# Patient Record
Sex: FEMALE | Race: WHITE | NOT HISPANIC OR LATINO | Employment: FULL TIME | URBAN - METROPOLITAN AREA
[De-identification: names, ages, dates, MRNs, and addresses within clinical notes are randomized per-mention and may not be internally consistent; named-entity substitution may affect disease eponyms.]

---

## 2022-01-11 ENCOUNTER — LAB REQUISITION (OUTPATIENT)
Dept: LAB | Facility: CLINIC | Age: 32
End: 2022-01-11

## 2022-01-11 PROCEDURE — 86706 HEP B SURFACE ANTIBODY: CPT | Performed by: PLASTIC SURGERY

## 2022-01-11 PROCEDURE — 87389 HIV-1 AG W/HIV-1&-2 AB AG IA: CPT | Performed by: PLASTIC SURGERY

## 2022-01-11 PROCEDURE — 87522 HEPATITIS C REVRS TRNSCRPJ: CPT | Performed by: PLASTIC SURGERY

## 2022-01-11 PROCEDURE — 86803 HEPATITIS C AB TEST: CPT | Performed by: PLASTIC SURGERY

## 2022-01-12 LAB
HBV SURFACE AB SERPL IA-ACNC: 102.1 M[IU]/ML
HCV AB SERPL QL IA: NONREACTIVE
HIV 1+2 AB+HIV1 P24 AG SERPL QL IA: NONREACTIVE

## 2022-01-13 LAB — HCV RNA SERPL NAA+PROBE-ACNC: NOT DETECTED IU/ML

## 2024-06-05 ENCOUNTER — HOSPITAL ENCOUNTER (EMERGENCY)
Facility: CLINIC | Age: 34
Discharge: HOME | End: 2024-06-05
Attending: EMERGENCY MEDICINE | Admitting: EMERGENCY MEDICINE
Payer: OTHER MISCELLANEOUS

## 2024-06-05 VITALS
OXYGEN SATURATION: 99 % | SYSTOLIC BLOOD PRESSURE: 153 MMHG | RESPIRATION RATE: 20 BRPM | DIASTOLIC BLOOD PRESSURE: 87 MMHG | HEART RATE: 67 BPM | WEIGHT: 183 LBS | HEIGHT: 60 IN | BODY MASS INDEX: 35.93 KG/M2 | TEMPERATURE: 98.2 F

## 2024-06-05 DIAGNOSIS — M62.830 BACK MUSCLE SPASM: ICD-10-CM

## 2024-06-05 DIAGNOSIS — M54.50 ACUTE BILATERAL LOW BACK PAIN WITHOUT SCIATICA: ICD-10-CM

## 2024-06-05 PROCEDURE — 99284 EMERGENCY DEPT VISIT MOD MDM: CPT | Mod: 25

## 2024-06-05 PROCEDURE — 96374 THER/PROPH/DIAG INJ IV PUSH: CPT

## 2024-06-05 PROCEDURE — 250N000013 HC RX MED GY IP 250 OP 250 PS 637: Performed by: EMERGENCY MEDICINE

## 2024-06-05 PROCEDURE — 250N000011 HC RX IP 250 OP 636: Performed by: EMERGENCY MEDICINE

## 2024-06-05 RX ORDER — CYCLOBENZAPRINE HCL 10 MG
10 TABLET ORAL 3 TIMES DAILY PRN
Qty: 20 TABLET | Refills: 0 | Status: SHIPPED | OUTPATIENT
Start: 2024-06-05 | End: 2024-06-11

## 2024-06-05 RX ORDER — KETOROLAC TROMETHAMINE 15 MG/ML
15 INJECTION, SOLUTION INTRAMUSCULAR; INTRAVENOUS ONCE
Status: COMPLETED | OUTPATIENT
Start: 2024-06-05 | End: 2024-06-05

## 2024-06-05 RX ORDER — METHYLPREDNISOLONE 4 MG
TABLET, DOSE PACK ORAL
Qty: 21 TABLET | Refills: 0 | Status: SHIPPED | OUTPATIENT
Start: 2024-06-05

## 2024-06-05 RX ORDER — DIAZEPAM 5 MG
5 TABLET ORAL ONCE
Status: COMPLETED | OUTPATIENT
Start: 2024-06-05 | End: 2024-06-05

## 2024-06-05 RX ORDER — OXYCODONE HYDROCHLORIDE 5 MG/1
5 TABLET ORAL EVERY 6 HOURS PRN
Qty: 10 TABLET | Refills: 0 | Status: SHIPPED | OUTPATIENT
Start: 2024-06-05 | End: 2024-06-08

## 2024-06-05 RX ORDER — OXYCODONE HYDROCHLORIDE 5 MG/1
5 TABLET ORAL ONCE
Status: COMPLETED | OUTPATIENT
Start: 2024-06-05 | End: 2024-06-05

## 2024-06-05 RX ADMIN — KETOROLAC TROMETHAMINE 15 MG: 15 INJECTION, SOLUTION INTRAMUSCULAR; INTRAVENOUS at 13:24

## 2024-06-05 RX ADMIN — DIAZEPAM 5 MG: 5 TABLET ORAL at 13:23

## 2024-06-05 RX ADMIN — OXYCODONE HYDROCHLORIDE 5 MG: 5 TABLET ORAL at 14:50

## 2024-06-05 ASSESSMENT — COLUMBIA-SUICIDE SEVERITY RATING SCALE - C-SSRS
6. HAVE YOU EVER DONE ANYTHING, STARTED TO DO ANYTHING, OR PREPARED TO DO ANYTHING TO END YOUR LIFE?: NO
2. HAVE YOU ACTUALLY HAD ANY THOUGHTS OF KILLING YOURSELF IN THE PAST MONTH?: NO
1. IN THE PAST MONTH, HAVE YOU WISHED YOU WERE DEAD OR WISHED YOU COULD GO TO SLEEP AND NOT WAKE UP?: NO

## 2024-06-05 ASSESSMENT — ACTIVITIES OF DAILY LIVING (ADL)
ADLS_ACUITY_SCORE: 35

## 2024-06-05 NOTE — ED PROVIDER NOTES
Emergency Department Note      History of Present Illness     Chief Complaint  Back Pain and Back Injury    HPI  Isa Day is a 33 year old female who presents due to back pain. The patient reports that she works as a surgical tech at the [employer] when she bent down to  a box of chux. After bending, the patient notes that she heard a popping sound followed by lower back pain. The patient reports difficulty walking, sitting, and laying down due to pain. 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 The denies bowel or bladder incontinence, numbness or weakness in extremities, cough, congestion, or fever. She also denies IV drug use or recent injections in his back.     Independent Historian  None    Review of External Notes  I reviewed the sports medicine note from 1/26/2024.   Past Medical History   Medical History and Problem List  Reactive attachment disorder  GERD   Depression     Medications  Lexapro     Surgical History   [place] teeth extraction   Femur surgery   Physical Exam   Patient Vitals for the past 24 hrs:   BP Temp Pulse Resp SpO2 Height Weight   06/05/24 1605 -- -- 67 20 99 % -- --   06/05/24 1600 (!) 153/87 -- -- -- -- -- --   06/05/24 0937 (!) 185/92 98.2  F (36.8  C) 79 18 98 % 1.524 m (5') 83 kg (183 lb)     Physical Exam  General: Resting on the bed.  Head: No obvious trauma to head.  Ears, Nose, Throat:  External ears normal.  Nose normal.  No pharyngeal erythema, swelling or exudate.  Midline uvula.    Eyes:  Conjunctivae clear.  Pupils are equal, round, and reactive.   Neck: Normal range of motion.  Neck supple.   CV: Regular rate and rhythm.  No murmurs.      Respiratory: Effort normal and breath sounds normal.  No wheezing or crackles.   Gastrointestinal: Soft.  No distension. There is no tenderness.  There is no rigidity, no rebound and no guarding.   Musculoskeletal: Normal range of motion.  Non tender extremities  to palpations.  Nontender thoracic, lumbar spine.  No step-off or deformity.  Tenderness over the bilateral lower back, tenderness over the bilateral paraspinous muscles.  Neuro: Alert. Moving all extremities appropriately.  Normal speech.  5/5 lower extremity strength.  Sensation intact to light touch.  2+ patellar reflexes.  No saddle anesthesia.  Skin: Skin is warm and dry.  No rash noted.     Diagnostics   Lab Results   Labs Ordered and Resulted from Time of ED Arrival to Time of ED Departure - No data to display    Imaging  No orders to display       EKG     Independent Interpretation  None  ED Course    Medications Administered  Medications   diazepam (VALIUM) tablet 5 mg (5 mg Oral $Given 6/5/24 1323)   ketorolac (TORADOL) injection 15 mg (15 mg Intravenous $Given 6/5/24 1324)   oxyCODONE (ROXICODONE) tablet 5 mg (5 mg Oral $Given 6/5/24 1450)       Procedures  Procedures     Discussion of Management  None    Social Determinants of Health adding to complexity of care  None    ED Course  ED Course as of 06/05/24 1657   Wed Jun 05, 2024   1301 I obtained history and examined the patient as noted above.    1557 I reassessed the patient.  Discussed results.  Patient is agreeable and discharged home.     Medical Decision Making / Diagnosis   CMS Diagnoses: None    MIPS     None    MDM  Isa Day is a 33 year old female who presents to the ER with back pain.  Vital signs are reassuring.  She is hypertensive but this is likely related to pain.  This did improve on recheck.  She was advised to continue to follow this.  Broad differentials considered include not limited to muscle spasm, fracture, dislocation, sprain strain, herniated disc, epidural abscess, epidural hematoma, discitis, referred pain, etc.  Patient denies any falls or trauma.  No tenderness to midline.  Does not appear consistent with fracture or dislocation.  We discussed possible x-rays and she declines at this time.  This seems reasonable  without any traumatic injuries.  Patient has no saddle anesthesia, no fevers or chills, no blood thinner use, no IV street drug use, I have very low suspicion for epidural abscess, epidural hematoma, discitis, etc.  She has no focal weakness numbness or tingling.  There is no indication for emergent MRI at this time.  Rather supportive care.  She felt improved after above interventions.  Did not feel the Valium was helpful so therefore opted to switch her to Flexeril as an outpatient.  Oxycodone to use sparingly.  Tylenol ibuprofen as well as a Medrol Dosepak.  We discussed return precautions including weakness numbness or tingling in the groin.  Discussed following up with orthospine.  Patient is agreeable and is discharged home.    Disposition  The patient was discharged.     ICD-10 Codes:    ICD-10-CM    1. Acute bilateral low back pain without sciatica  M54.50       2. Back muscle spasm  M62.830            Discharge Medications  Discharge Medication List as of 6/5/2024  4:03 PM        START taking these medications    Details   cyclobenzaprine (FLEXERIL) 10 MG tablet Take 1 tablet (10 mg) by mouth 3 times daily as needed for muscle spasms, Disp-20 tablet, R-0, E-Prescribe      methylPREDNISolone (MEDROL DOSEPAK) 4 MG tablet therapy pack Follow Package Directions, Disp-21 tablet, R-0, E-Prescribe      oxyCODONE (ROXICODONE) 5 MG tablet Take 1 tablet (5 mg) by mouth every 6 hours as needed for pain, Disp-10 tablet, R-0, E-Prescribe             Scribe Disclosure:  I, Shalini Pyle, am serving as a scribe at 12:38 PM on 6/5/2024 to document services personally performed by Marisol Montgomery MD based on my observations and the provider's statements to me.        Marisol Montgomery MD  06/05/24 2440

## 2024-06-05 NOTE — DISCHARGE INSTRUCTIONS
Please see your PCP in 2-3 days for a recheck.  If you have increasing pain, loss of bowel or bladder function, numbness in your groin, unable to walk, fevers >101 or other acute changes, return to the ED.      May alternate flexeril and oxycodone.  Be cautious as these both may make you sleepy.  Do not drink drive or operate Heavy machinery.    Discharge Instructions  Back Pain  You were seen today for back pain. Back pain can have many causes, but most will get better without surgery or other specific treatment. Sometimes there is a herniated ( slipped ) disc. We do not usually do MRI scans to look for these right away, since most herniated discs will get better on their own with time.  Today, we did not find any evidence that your back pain was caused by a serious condition. However, sometimes symptoms develop over time and cannot be found during an emergency visit, so it is very important that you follow up with your primary provider.  Generally, every Emergency Department visit should have a follow-up clinic visit with either a primary or a specialty clinic/provider. Please follow-up as instructed by your emergency provider today.    Return to the Emergency Department if:  You develop a fever with your back pain.   You have weakness or change in sensation in one or both legs.  You lose control of your bowels or bladder, or cannot empty your bladder (cannot pee).  Your pain gets much worse.     Follow-up with your provider:  Unless your pain has completely gone away, please make an appointment with your provider within one week. Most of the routine care for back pain is available in a clinic and not the Emergency Department. You may need further management of your back pain, such as more pain medication, imaging such as an X-ray or MRI, or physical therapy.    What can I do to help myself?  Remain Active -- People are often afraid that they will hurt their back further or delay recovery by remaining active, but  this is one of the best things you can do for your back. In fact, staying in bed for a long time to rest is not recommended. Studies have shown that people with low back pain recover faster when they remain active. Movement helps to bring blood flow to the muscles and relieve muscle spasms as well as preventing loss of muscle strength.  Heat -- Using a heating pad can help with low back pain during the first few weeks. Do not sleep with a heating pad, as you can be burned.   Pain medications - You may take a pain medication such as Tylenol  (acetaminophen), Advil , Motrin  (ibuprofen) or Aleve  (naproxen).  If you were given a prescription for medicine here today, be sure to read all of the information (including the package insert) that comes with your prescription.  This will include important information about the medicine, its side effects, and any warnings that you need to know about.  The pharmacist who fills the prescription can provide more information and answer questions you may have about the medicine.  If you have questions or concerns that the pharmacist cannot address, please call or return to the Emergency Department.   Remember that you can always come back to the Emergency Department if you are not able to see your regular provider in the amount of time listed above, if you get any new symptoms, or if there is anything that worries you.

## 2024-06-05 NOTE — ED TRIAGE NOTES
[facility]  ED Arrival Note    Arrives through triage. ABC's intact. A &O X4. . Pt arrives with c/o lower back injury while working. Rpeorts she was putting supplies away and heard a pop in her back. Now, 10/10 pain, unable to sit. Dneies urinary or bowel incontinence. Negative for extremity numbness or tingling.       Visitors during triage: None      Triage Interventions: Direct rooming     Ambulatory: Yes    Meets Stroke Criteria?: No    Meets Trauma Criteria?: No    Shock Index (HR/SBP): <0.8, for provider reference    Directed to: Triage Lobby    Pronouns: she/her       Triage Assessment (Adult)       Row Name 06/05/24 0942          Triage Assessment    Airway WDL WDL        Respiratory WDL    Respiratory WDL WDL        Skin Circulation/Temperature WDL    Skin Circulation/Temperature WDL WDL        Cardiac WDL    Cardiac WDL WDL        Peripheral/Neurovascular WDL    Peripheral Neurovascular WDL WDL        Cognitive/Neuro/Behavioral WDL    Cognitive/Neuro/Behavioral WDL WDL

## 2024-07-10 ENCOUNTER — HOSPITAL ENCOUNTER (OUTPATIENT)
Facility: CLINIC | Age: 34
Setting detail: OBSERVATION
Discharge: HOME | End: 2024-07-12
Attending: EMERGENCY MEDICINE | Admitting: NURSE PRACTITIONER
Payer: COMMERCIAL

## 2024-07-10 DIAGNOSIS — F33.1 MAJOR DEPRESSIVE DISORDER, RECURRENT EPISODE, MODERATE (H): ICD-10-CM

## 2024-07-10 DIAGNOSIS — F99 INSOMNIA DUE TO OTHER MENTAL DISORDER: ICD-10-CM

## 2024-07-10 DIAGNOSIS — Z72.89 DELIBERATE SELF-CUTTING: ICD-10-CM

## 2024-07-10 DIAGNOSIS — F51.05 INSOMNIA DUE TO OTHER MENTAL DISORDER: ICD-10-CM

## 2024-07-10 DIAGNOSIS — R45.851 SUICIDAL IDEATION: ICD-10-CM

## 2024-07-10 PROBLEM — F32.A DEPRESSION, UNSPECIFIED DEPRESSION TYPE: Status: ACTIVE | Noted: 2024-07-10

## 2024-07-10 PROBLEM — F32.9 MAJOR DEPRESSIVE DISORDER WITH CURRENT ACTIVE EPISODE: Status: ACTIVE | Noted: 2024-07-10

## 2024-07-10 PROBLEM — F32.9 MAJOR DEPRESSIVE DISORDER WITH CURRENT ACTIVE EPISODE: Status: RESOLVED | Noted: 2024-07-10 | Resolved: 2024-07-10

## 2024-07-10 PROCEDURE — G0378 HOSPITAL OBSERVATION PER HR: HCPCS

## 2024-07-10 PROCEDURE — 99285 EMERGENCY DEPT VISIT HI MDM: CPT

## 2024-07-10 PROCEDURE — 250N000013 HC RX MED GY IP 250 OP 250 PS 637: Performed by: NURSE PRACTITIONER

## 2024-07-10 PROCEDURE — 99223 1ST HOSP IP/OBS HIGH 75: CPT | Performed by: NURSE PRACTITIONER

## 2024-07-10 RX ORDER — ESCITALOPRAM OXALATE 10 MG/1
10 TABLET ORAL DAILY
COMMUNITY
End: 2024-07-12

## 2024-07-10 RX ORDER — TRAZODONE HYDROCHLORIDE 50 MG/1
50 TABLET, FILM COATED ORAL AT BEDTIME
Status: DISCONTINUED | OUTPATIENT
Start: 2024-07-10 | End: 2024-07-12 | Stop reason: HOSPADM

## 2024-07-10 RX ORDER — ACETAMINOPHEN 325 MG/1
650 TABLET ORAL EVERY 6 HOURS PRN
Status: DISCONTINUED | OUTPATIENT
Start: 2024-07-10 | End: 2024-07-12 | Stop reason: HOSPADM

## 2024-07-10 RX ORDER — ESCITALOPRAM OXALATE 20 MG/1
20 TABLET ORAL DAILY
Status: DISCONTINUED | OUTPATIENT
Start: 2024-07-11 | End: 2024-07-12 | Stop reason: HOSPADM

## 2024-07-10 RX ORDER — HYDROXYZINE HYDROCHLORIDE 50 MG/1
50 TABLET, FILM COATED ORAL EVERY 6 HOURS PRN
Status: DISCONTINUED | OUTPATIENT
Start: 2024-07-10 | End: 2024-07-12 | Stop reason: HOSPADM

## 2024-07-10 RX ORDER — ACETAMINOPHEN AND CODEINE PHOSPHATE 120; 12 MG/5ML; MG/5ML
1 SOLUTION ORAL DAILY
COMMUNITY
Start: 2023-10-26

## 2024-07-10 RX ADMIN — HYDROXYZINE HYDROCHLORIDE 50 MG: 50 TABLET, FILM COATED ORAL at 17:09

## 2024-07-10 RX ADMIN — TRAZODONE HYDROCHLORIDE 50 MG: 50 TABLET ORAL at 22:02

## 2024-07-10 ASSESSMENT — ACTIVITIES OF DAILY LIVING (ADL)
ADLS_ACUITY_SCORE: 35

## 2024-07-10 NOTE — ED PROVIDER NOTES
[facility] Unit - Initial Psychiatric Observation Note  [facility] Emergency Department  Observation Initiation Date: Jul 10, 2024    Isa Dya MRN: [id number]   Age: 34 year old YOB: 1990     History     Chief Complaint   Patient presents with    Suicidal    Depression     HPI  Isa Day is a 34 year old female with a past history notable for major depressive disorder who presented to the emergency department for worsening depression accompanied by suicidal ideation and self-harm behavior.  Patient was medically evaluated in the emergency department and determined to be medically stable for transfer to [facility] for further psychiatric assessment.  Patient is nearing 3.5 hours in emergency care at this time.    Here at [facility], patient describes recent worsening in depressive symptoms over the past 2 weeks without an identifiable trigger.  She reports she started taking escitalopram 10 mg in January of this year, and experienced significant improvement in mood symptoms.  She reports sustaining these improvements until about 2 weeks ago.  Since then, she is noting increased hopelessness, accompanied by increased intensity of suicidal thinking.  She has engaged in self injury by cutting recently.  She reports thoughts of cutting deeply enough to bleed out and die, but has not yet done this.  She describes decreased motivation, lack of interest, low energy, decreased concentration.  She endorses lack of appetite, needing to force herself to eat at times.  She reports very poor sleep and has only slept 2 to 3 hours per night the last 3-4 nights.  She finds her mind wandering at night, unable to quiet her thoughts.  She does not feel as though anxiety is contributing to her current level of distress.  She notes stressors which include her relationship as well as her job.  She denies any changes with her job recently.  In regard to her relationship, she reports that this is her first serious relationship and  "she struggles going \"how to be in the relationship.\"  She is not able to expand upon this further.  She denies any communication issues or specific problems within her relationship.  She is seeking medication changes aimed at improving mood and sleep quality.  She is open to therapeutic resources following discharge.  She anticipates remaining on observation overnight.      Past Medical History  No past medical history on file.  No past surgical history on file.  norethindrone (MICRONOR) 0.35 MG tablet  escitalopram (LEXAPRO) 10 MG tablet  methylPREDNISolone (MEDROL DOSEPAK) 4 MG tablet therapy pack      Allergies   Allergen Reactions    Latex Hives     Family History  No family history on file.  Social History           Review of Systems  A medically appropriate review of systems was performed with pertinent positives and negatives noted in the HPI, and all other systems negative.    Physical Examination   BP: (!) 175/98  Pulse: 72  Temp: 98.9  F (37.2  C)  Resp: 16  Weight: 83 kg (183 lb)  SpO2: 100 %    Physical Exam  General: Appears stated age.   Neuro: Alert and fully oriented. Extremities appear to demonstrate normal strength on visual inspection.   Integumentary/Skin: no rash visualized, normal color    Psychiatric Examination   Appearance: awake, alert, adequately groomed, appeared as age stated, and casually dressed  Attitude:  cooperative  Eye Contact:  fair  Mood:  depressed  Affect:  mood congruent and intensity is blunted  Speech:  clear, coherent and normal prosody  Psychomotor Behavior:  no evidence of tardive dyskinesia, dystonia, or tics  Thought Process:  linear and goal oriented  Associations:  no loose associations  Thought Content:  no evidence of psychotic thought, passive suicidal ideation present, no auditory hallucinations present, and no visual hallucinations present  Insight:  fair  Judgement:  fair  Oriented to:  time, person, and place  Attention Span and Concentration:  fair  Recent " and Remote Memory:  intact  Language: able to name/identify objects without impairment  Fund of Knowledge: intact with awareness of current and past events    ED Course     ED Course as of 07/10/24 1726   Wed Jul 10, 2024   1417 I obtained history and examined the patient as noted above         Labs Ordered and Resulted from Time of ED Arrival to Time of ED Departure - No data to display    Assessments & Plan (with Medical Decision Making)   Patient presenting with worsening depressive symptoms over the last 2 weeks with no identifiable trigger.  Patient began taking Lexapro 10 mg in January of this year, and had good results for the past 6 months but no longer feels it is effective.  Given her initial benefit, we will try a higher dose prior to switching medications.  She would benefit from DBT or IOP upon discharge.. Nursing notes reviewed noting no acute issues.     I have reviewed the assessment completed by the [facility].     During the observation period, the patient did not require medications for agitation, and did not require restraints/seclusion for patient and/or provider safety.     The patient was found to have a psychiatric condition that would benefit from an observation stay in the emergency department for further psychiatric stabilization and/or coordination of a safe disposition. The observation plan includes serial assessments of psychiatric condition, potential administration of medications if indicated, further disposition pending the patient's psychiatric course during the monitoring period.     Preliminary diagnosis:    ICD-10-CM    1. Deliberate self-cutting  Z72.89       2. Major depressive disorder, recurrent episode, moderate (H)  F33.1       3. Insomnia due to other mental disorder  F51.05     F99       4. Suicidal ideation  R45.851            Treatment Plan:  - Increase Lexapro from 10 mg daily to 20 mg daily to further target symptoms of depression  - Trial trazodone 50 mg nightly for  treatment of insomnia  - Hydroxyzine 50 mg every 6 hours as needed for symptoms of anxiety  - Patient may benefit from IOP or a DBT program following discharge  - [place] to observation status with plan for reassessment tomorrow    ---      Haryr Pittman CNP   [facility] EMERGENCY DEPT  [facility] Unit      Harry Pittman CNP  07/10/24 [id number]

## 2024-07-10 NOTE — PROGRESS NOTES
"Patient is 34 year old female with history of depression, anxiety received from ED due to having increasing anxiety, and depressed mood. Reports feeling hopeless, having suicidal thought for two weeks. and Pt engage in self injury, and cut her wrist last night. Pt endorses  SI with plan to cut her wrist and bleed out. She reports she is been having difficult sleeping for sometime now, sleeping about 2 to 3 hours at night for the two week. Pt crying during intake stating\" I know I am suppose to be here, I need all the help\" Pt reports this is not the first episodes of self injury. This has happens before. She wants medication management, and and therapeutic resources that can help her anxiety. Pt denies auditory and visual hallucination.     Nursing and risk assessments completed. Assessments reviewed with [facility] and physician. Admission information reviewed with patient. Patient given a tour of [facility] and instructions on using the facility. Questions regarding [facility] addressed. Pt safety search completed.    "
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
## 2024-07-10 NOTE — ED NOTES
[facility]  ED to EMPATH Checklist:      Goal for EMPATH: Depression management    Current Behavior: Sad    Safety Concerns: Suicidal, no plan    Legal Hold Status: Voluntary    Medically Cleared by ED provider: Yes    Patient Therapeutically Searched: Therapeutic search by ED staff (strings, belts, shoes, pockets, electronics, etc.)    Belongings: Remain with patient    Independent Ambulation at Baseline: Yes/No: Yes    Participates in Care/Conversation: Yes/No: Yes    Patient Informed about E[facility]: Yes/No: Yes    DEC: Ordered and pending    Patient Ready to be Transferred to EMP[facility]? Yes/No: Yes

## 2024-07-10 NOTE — Clinical Note
Isa Day was seen and treated in our emergency department on 7/10/2024.  She may return to work on 07/15/2024.       If you have any questions or concerns, please don't hesitate to call.      Traci Diaz, JEREMIE CNP

## 2024-07-10 NOTE — ED PROVIDER NOTES
Emergency Department Note      History of Present Illness     Chief Complaint   Suicidal and Depression      HPI   Isa Day is a 34 year old female who presents with depressive symptoms. Patient notes that she's in an ongoing depressive episode that has sustained for the past few weeks. She reports that she cut along her left forearm with a blade last night at 1630. She states that she washed her wounds with antibacterial soap. She endorses practicing self cutting. She endorses a history of self-harm, noting that she uses it as a coping mechanism. Patient notes that she is compliant with her Lexapro prescription. She denies having a therapist due to past issues with therapy. She denies having a psychiatrist. She states that she sees her general practitioner for mental health issues. She denies a history of intentional overdose, alcohol abuse, or street drugs use. She denies other medical problems besides back pain sustained a few weeks ago.    Independent Historian   None    Review of External Notes   Reviewed nurse triage note from 7/9/24    Past Medical History     Medical History and Problem List   Depression  Attachment disorder  Polymenorrhea  Dentinogenesis imperfecta    Medications   Lexapro  Micronor    Physical Exam     Patient Vitals for the past 24 hrs:   BP Temp Temp src Pulse Resp SpO2 Weight   07/10/24 1418 (!) 175/98 98.9  F (37.2  C) Temporal 72 16 100 % 83 kg (183 lb)     Physical Exam  General: Resting on the bed.  Head: No obvious trauma to head.  Ears, Nose, Throat:  External ears normal.  Nose normal.    Eyes:  Conjunctivae clear.    CV: Regular rate and rhythm.  No murmurs.      Respiratory: Effort normal and breath sounds normal.  No wheezing or crackles.   Gastrointestinal: Soft.  No distension. There is no tenderness.   Neuro: Alert. Moving all extremities appropriately.  Normal speech.    Skin: Skin is warm and dry.  Linear abrasions over the right wrist and forearm, linear  lacerations noted to the medial forearm.  2+ radial pulses.   5/5.  Sensation intact.    Psych: Behavior is normal. Calm cooperative.  Insight appears intact.  Reports depressed mood.  Non labile.  Reports cutting but not current SI or plan.  No HI.        Diagnostics     Lab Results   Labs Ordered and Resulted from Time of ED Arrival to Time of ED Departure - No data to display    Imaging   No orders to display         Independent Interpretation   None    ED Course      Medications Administered   Medications   hydrOXYzine HCl (ATARAX) tablet 50 mg (50 mg Oral $Given 7/10/24 1705)   escitalopram (LEXAPRO) tablet 20 mg (has no administration in time range)   traZODone (DESYREL) tablet 50 mg (has no administration in time range)   acetaminophen (TYLENOL) tablet 650 mg (has no administration in time range)       Procedures   Procedures     Discussion of Management   None    ED Course   ED Course as of 07/10/24 2111   Wed Jul 10, 2024   1417 I obtained history and examined the patient as noted above         Optional/Additional Documentation  None    Medical Decision Making / Diagnosis     CMS Diagnoses: None    MIPS       None    MDM   Isa Day is a 34 year old female who presents to the emergency department for self cutting and depression.  Vitals are mild hypertension but otherwise unremarkable.  Broad differentials considered include not limited to decompensated mental illness, overdose, toxic ingestion, trauma, etc.  Patient has lacerations that do not require repair as here greater than 12 hours old.  They are cleaned and dressed in the emergency department.  She has no evidence of neurovascular compromise.  Her tetanus is up-to-date 2024.  Patient denies any alcohol, toxic ingestion, overdose.  No signs of other trauma.  This seems most consistent with a decompensated mental illness requiring observation in the emergency department in the [facility] unit.  Patient was transferred to the [facility] unit for  ongoing assessment and management.    Disposition   The patient was transferred to [facility].     Diagnosis     ICD-10-CM    1. Deliberate self-cutting  Z72.89       2. Major depressive disorder, recurrent episode, moderate (H)  F33.1       3. Insomnia due to other mental disorder  F51.05     F99       4. Suicidal ideation  R45.851            Discharge Medications   New Prescriptions    No medications on file         Scribe Disclosure:  I, Ruth Ann Westbrook, am serving as a scribe at 5:17 PM on 7/10/2024 to document services personally performed by Marisol Montgomery MD based on my observations and the provider's statements to me.        Marisol Montgomery MD  07/10/24 8531

## 2024-07-10 NOTE — ED TRIAGE NOTES
Pt has been feeling suicidal and depressed for some time and stated she ahs been holding everything in for too long.  Tearful in triage - there are a few areas with cuts along her left arm that she did clean and had bandages on.  We will clean these and dress them in triage while waiting for [facility] . Pt contracts for safety in triage and her mom is with her .  She voluntarily wants to go to [facility].      Triage Assessment (Adult)       Row Name 07/10/24 5374          Triage Assessment    Airway WDL WDL        Respiratory WDL    Respiratory WDL WDL        Skin Circulation/Temperature WDL    Skin Circulation/Temperature WDL X  several tri groups of cuts on the left arm        Cardiac WDL    Cardiac WDL WDL        Peripheral/Neurovascular WDL    Peripheral Neurovascular WDL WDL        Cognitive/Neuro/Behavioral WDL    Cognitive/Neuro/Behavioral WDL mood/behavior;X     Level of Consciousness alert     Arousal Level opens eyes spontaneously     Orientation oriented x 4     Mood/Behavior sad

## 2024-07-11 PROCEDURE — G0378 HOSPITAL OBSERVATION PER HR: HCPCS

## 2024-07-11 PROCEDURE — 99232 SBSQ HOSP IP/OBS MODERATE 35: CPT | Performed by: PSYCHIATRY & NEUROLOGY

## 2024-07-11 PROCEDURE — 250N000013 HC RX MED GY IP 250 OP 250 PS 637: Performed by: NURSE PRACTITIONER

## 2024-07-11 RX ADMIN — HYDROXYZINE HYDROCHLORIDE 50 MG: 50 TABLET, FILM COATED ORAL at 22:51

## 2024-07-11 RX ADMIN — ESCITALOPRAM OXALATE 20 MG: 20 TABLET, FILM COATED ORAL at 08:07

## 2024-07-11 RX ADMIN — TRAZODONE HYDROCHLORIDE 50 MG: 50 TABLET ORAL at 22:51

## 2024-07-11 ASSESSMENT — ACTIVITIES OF DAILY LIVING (ADL)
ADLS_ACUITY_SCORE: 35
HYGIENE/GROOMING: INDEPENDENT
ADLS_ACUITY_SCORE: 35

## 2024-07-11 NOTE — ED NOTES
Patient requested bandages be changed. Superficial cuts to left wrist and forearm. Bacitracin, gauze, and coban applied.

## 2024-07-11 NOTE — ED PROVIDER NOTES
[facility] Unit - Psychiatric Consultation  [facility] Emergency Department    Isa Day MRN: [id number]   Age: 34 year old YOB: 1990     History     Chief Complaint   Patient presents with    Suicidal    Depression     HPI  Isa Day is a 34 year old female with history notable for major depressive disorder who is currently under observation status on the [facility] unit, now approaching 25 hours in the emergency department.  Overnight, there were no acute issues.  On reassessment, the patient reports sleeping well last night.  Mood remains depressed.  Anxiety is mild to moderate.  She admits that she has a hard time trusting individuals in discussing her mental health symptoms.  She is tolerating the higher dose of Lexapro without side effects.  She confirms achieving a good response after the medication was initially initiated however the benefit has since diminished.  Energy is low.  Appetite is fair.  Concentration is less than optimal.  She denied active suicidal thoughts.  There was no indication of psychosis or homicidal thoughts.  Citing ongoing depressive symptoms, she would like to extend her stay on the unit 1 more night.    Past Medical History  No past medical history on file.  No past surgical history on file.  escitalopram (LEXAPRO) 10 MG tablet  norethindrone (MICRONOR) 0.35 MG tablet  methylPREDNISolone (MEDROL DOSEPAK) 4 MG tablet therapy pack      Allergies   Allergen Reactions    Latex Hives     Family History  No family history on file.  Social History           Review of Systems  A medically appropriate review of systems was performed with pertinent positives and negatives noted in the HPI, and all other systems negative.    Physical Examination   BP: (!) 175/98  Pulse: 72  Temp: 98.9  F (37.2  C)  Resp: 16  Weight: 83 kg (183 lb)  SpO2: 100 %    Physical Exam  General: Appears stated age.   Neuro: Alert and fully oriented. Extremities appear to demonstrate normal strength on  visual inspection.   Integumentary/Skin: no rash visualized, normal color    Psychiatric Examination   Appearance: awake, alert  Attitude:  cooperative  Eye Contact:  fair  Mood:  depressed  Affect:  mood congruent and intensity is blunted  Speech:  clear, coherent  Psychomotor Behavior:  no evidence of tardive dyskinesia, dystonia, or tics  Thought Process:  linear  Associations:  no loose associations  Thought Content:  no evidence of suicidal ideation or homicidal ideation and no evidence of psychotic thought  Insight:  fair  Judgement:  fair  Oriented to:  time, person, and place  Attention Span and Concentration:  fair  Recent and Remote Memory:  fair  Language: able to name/identify objects without impairment  Fund of Knowledge: intact with awareness of current and past events    ED Course     ED Course as of 07/11/24 1419   Wed Jul 10, 2024   1417 I obtained history and examined the patient as noted above         Labs Ordered and Resulted from Time of ED Arrival to Time of ED Departure - No data to display    Assessments & Plan (with Medical Decision Making)   Patient presenting with worsening depressed mood despite engaging in routine outpatient care.  Her treatment plan is focused on optimizing antidepressant interventions and helping to improve her access to outpatient mental health resources. Nursing notes reviewed noting no acute issues.     I have reviewed the assessment completed by the [facility].     Preliminary diagnosis:    ICD-10-CM    1. Deliberate self-cutting  Z72.89       2. Major depressive disorder, recurrent episode, moderate (H)  F33.1       3. Insomnia due to other mental disorder  F51.05     F99       4. Suicidal ideation  R45.851            Treatment Plan:  -Continue the higher dose of Lexapro at 20 mg daily as we aim to optimize antidepressant treatments.  -Continue trazodone 50 mg nightly for insomnia  -Continue hydroxyzine 50 mg as needed for anxiety  -Consider intensive outpatient  programming  -Referral for individual psychotherapy  -Anticipate resuming medication management appointments on an outpatient basis  -Continue observation status and reassess tomorrow.    --  Babak Tucker MD   [facility] EMERGENCY DEPT  [facility] Unit       Babak Tucker MD  07/11/24 3584

## 2024-07-11 NOTE — ED NOTES
"Patient stated she \"slept ok\", ate breakfast in recliner and took morning meds. Pleasant and cooperative. Mom called to ask how she was doing, expressed concern about patient's suicidal thoughts. Patient states she hasn't had much appetite.   "

## 2024-07-11 NOTE — DISCHARGE INSTRUCTIONS
Your Upcoming Appointments:  Type: Teletherapy  Date/Time: Wednesday, 7/17/2024   4:00 pm - 5:00 pm  Provider: nAa Martinez  [facility]  Location: Clinical and Developmental Services [facility], [address] [place] [address], [address], [place] [address] (VIRTUAL)  Phone: [phone]  Patient Instructions:TELEHEALTH/VIRTUAL services only. No in-person services. Once scheduled, new patients will be contacted via phone/email by CDS office managers to confirm basic intake information, prior to the appointment. Electronic device w/video capabilities is required for services. New patients will have e-documentation to complete prior to the first appointment (access to e-documentation provided by CDS  during intake process).    MENTAL HEALTH RESOURCES & SERVICES:   [facility] Scheduling  During your hospitalization, you were seen by a licensed mental health professional through Triage and Transition services, [facility] ([facility])  for a brief mental health assessment and/or psychotherapy at [facility], a [facility] of [facility].  It is recommended that you follow up with your established providers (psychiatrist, mental health therapist, and/or primary care doctor - as relevant) as soon as possible. Coordinators from [facility] will be calling you in the next 24-48 hours to ensure that you have the resources you need.  You can also contact [facility] coordinators directly at [phone].    Intensive Outpatient Programming   [facility] - typically has evening programs  [address], [address], [place] [address]   Ph: [phone] or [phone] - Call with questions on which track would be best for you or to start the referral process!   Fax [phone]   [facility] offers two intensive outpatient program (IOP) tracks: [facility] Depression Recovery IOP, and OCD and Anxiety IOP. Both programs meet Monday - Friday from 3:00pm - 6:00pm, and participation  typically lasts for 6 - 8 weeks.         Individual Therapy  Providers for therapy within the [facility] network are listed below within your home zip code. You can call and request an appointment directly with the clinic. It is advised you call your insurance you will be using to see if the therapist is in network with your insurance plan. If the following do not work, please call us ([facility]) at [phone] and we are happy to assist with appointment scheduling!     [place]   [address], [place] [address]   [phone]    [facility]   [address] [facility] [address]   [phone]    [facility]   [address] [facility] [address]   [phone]     [facility] [place], [facility]   [address] [address]   [phone]    [facility]ulema Villalta EdD[facility]   [address], [place] [address]   [phone]       [facility], [facility]   [address], [facility] [address]   [phone]    [employer], [facility]   Sofia Quintero MA, YESENIA, YESENIA Knapp, [facility]  Aayush Robles PsyD   [address] [address]   [phone]    Additional therapy clinic providers in the [facility] area:     [facility]: [phone]  [facility] [phone]  [facility] [phone]  [facility] [phone]  [facility] system  [phone]   [facility] in [facility] [phone]  [facility] [phone]    [facility] maintains an extensive network of licensed behavioral health providers to connect patients with the services they need.  We do not charge providers a fee to participate in our referral network.  We match patients with  "providers based on a patient s specific needs, insurance coverage, and location.  Our first effort will be to refer you to a provider within your care system, and will utilize providers outside your care system as needed.               WARMLINES:  The [place] Warmline provides a urgt-wc-lvwr approach to mental health recovery, support and wellness. Calls are answered by our team of professionally trained Certified Peer Specialists, who have first hand experience living with a mental health condition. Unlike a hotline or crisis line, Warmlines provide early intervention with emotional support that can prevent a crisis from escalating.  Open 7 Days/Week, 9am to 9pm.   Call [phone] (or 696-WARMLINE)  Text \"Support\" to 85779      The Peer Support Connection Warmline (PSC) The Peer Support Connection Warmline of [place] is a safe and free way for individuals to receive confidential and anonymous one on one peer support from trained Peers, Certified Peer Support Specialists, and Recovery Coaches. Unlike a hotline or crisis line, Warmlines provide early intervention with emotional support that can prevent a crisis from escalating.  Available from 5pm - 9am (7 days a week/365 days a year) [phone]  For callers who want to specifically talk to an  peer:    Available on Tuesday or Thursday from 5pm-9pm, call [phone]      ADDITIONAL SUPPORTS:  [employer] [place] [employer] Mental [employer]ness of [place] ([place]) provides FREE support groups and educational programs. Visit www.namimn.org Helpline at 1-792.772.7004 or 915-632-8686 for further information.   [facility] ([employer] [place] on Mental Illness) improves the lives of children and adults with mental illnesses and their families by providing free classes on mental illnesses andsupport groups for adults with mental illnesses, parents and family members.   For more information:  Phone: [phone]  Toll free: 7-880-PYUAProt-On[facility]  Website: " www.namihelps.org       [facility]  Virtual or in-person, free, mental health therapy, no appointment needed  [phone] [address] [place] [address]       You may contact the [facility] for brief, short-term solution focused therapy support with your mental health goals.   Call [phone] for more information or to schedule & see details here:

## 2024-07-11 NOTE — PROGRESS NOTES
"Patient requested to take a shower, new scrubs and towels offered. She stated\" I feel much better after this\"  "

## 2024-07-11 NOTE — PROGRESS NOTES
"Triage & Transition Services, Extended Care     Therapy Progress Note    Patient: Isa goes by \"Isa,\" uses she/her pronouns  Date of Service: July 11, 2024  Site of Service: [facility] EMERGENCY DEPT                             EMP05  Patient was seen yes  Mode of Assessment: In person    Presentation Summary: Pt presents to the ED after she had been writing goodbye letters in preparation for suicide, and was experiencing suicidal ideation constantly for several days. Patient currently does not endorse SI thoughts currently since Wednesday evening.  Patient continues to endorse sadness and sleep disturbance. Patient had Lexapro done increased today and last night tried Trazodone for sleep. Patient reports she woke up every hour last night, which is consistent with her typical sleep pattern. Patient reflected on her hx with therapists who have betrayed her trust and left her feeling abandoned. Patient reports her baseline depression intensity is a 3.5/10 with 1 being the best. Yesterday, patient reported the intensity of her depressive sx was a 9.5/10. She does endorse feeling better today and not endorsing suicidal thoughts, AH/VH, or HI.     Therapeutic Intervention(s) Provided: Reviewed healthy living that supports positive mental health, including looking at sleep hygiene, regular movement, nutrition, and regular socialization., Engaged in guided discovery, explored patient's perspectives and helped expand them through socratic dialogue., Provided positive reinforcement for progress towards goals, gains in knowledge, and application of skills previously taught.    Current Symptoms: anxious sense of doom, withdrawl/isolation, hoplessness, sadness anxious (Sleep disturbance/difficulty staying asleep)   loss of appetite    Mental Status Exam   Affect: Blunted, Flat  Appearance: Appropriate (Patient had recently just showered and was adequately groomed)  Attention Span/Concentration: " Attentive  Eye Contact: Engaged    Fund of Knowledge: Appropriate   Language /Speech Content: Fluent  Language /Speech Volume: Normal  Language /Speech Rate/Productions: Normal  Recent Memory: Intact  Remote Memory: Intact  Mood: Anxious, Depressed  Orientation to Person: Yes   Orientation to Place: Yes  Orientation to Time of Day: Yes  Orientation to Date: Yes     Situation (Do they understand why they are here?): Yes  Psychomotor Behavior: Normal, Hyperactive (Fidgeting with hands)  Thought Content: Clear  Thought Form: Intact    Treatment Objective(s) Addressed: rapport building, identifying treatment goals, exploring obstacles to safety in the community, processing feelings    Patient Response to Interventions: acceptance expressed    Progress Towards Goals: Patient Reports Symptoms Are: ongoing  Patient Progress Toward Goals: is making progress  Comment: Patient reports the intensity of her depressive sx and suicidal ideation has decreased since admission to the hospital, thought endorses feeling the same as when she arrived to the ED  Next Step to Work Toward Discharge: symptom stabilization  Symptom Stabilization Comment: Depressive sx improvement. Patient had been writing goodbye letters in preparation for suicide, and was experiencing suicidal ideation constantly for several days. Patient does not endorse SI thoughts currently since Wednesday evening.  Patient continues to endorse sadness and sleep disturbance.     Case Management: Case Management Included: completing or following up on referrals  Details on completing or following up on referrals: Updated patient's AVS to include list of providers from [facility] database for therapy.  Summary of Interaction: Provided education to patient about programmatic care and what to expect. Provided IOP option for evening programming.    Plan: observation  yes provider (Andannika saw patient today), RN         Clinical Substantiation:  Writer continues to recommend  observation in [facility] overnight for an additional night or continued monitoring, treatment and therapeutic intervention of mental health symptoms. Patient's presenting symptoms and intensity of depressive episode continues to stabilize, and coordination of appointments can be established for follow-up care with Extended Care. Patient is comfortable and agreeable with this plan. Patient continues to struggle with sleep.     Legal Status: Legal Status at Admission: Voluntary/Patient has signed consent for treatment    Session Status: Time session started: 1630  Time session ended: 1659  Session Duration (minutes): 19 minutes  Session Number: 1  Anticipated number of sessions or this episode of care: 2    Time Spent: 19 minutes    CPT Code: CPT Codes: 17105 - Psychotherapy (with patient) - 30 (16-37*) min    Diagnosis:   Patient Active Problem List   Diagnosis Code    Deliberate self-cutting Z72.89    Depression, unspecified depression type F32.A       Primary Problem This Admission: Active Hospital Problems    Deliberate self-cutting      *Depression, unspecified depression type        Daniel Angel   Licensed Mental Health Professional (LMHP), Extended Care  [phone]

## 2024-07-12 VITALS
TEMPERATURE: 98.3 F | OXYGEN SATURATION: 99 % | WEIGHT: 183 LBS | BODY MASS INDEX: 35.74 KG/M2 | DIASTOLIC BLOOD PRESSURE: 85 MMHG | RESPIRATION RATE: 20 BRPM | SYSTOLIC BLOOD PRESSURE: 124 MMHG | HEART RATE: 78 BPM

## 2024-07-12 PROCEDURE — 99239 HOSP IP/OBS DSCHRG MGMT >30: CPT

## 2024-07-12 PROCEDURE — 250N000013 HC RX MED GY IP 250 OP 250 PS 637: Performed by: NURSE PRACTITIONER

## 2024-07-12 PROCEDURE — G0378 HOSPITAL OBSERVATION PER HR: HCPCS

## 2024-07-12 RX ORDER — TRAZODONE HYDROCHLORIDE 50 MG/1
50 TABLET, FILM COATED ORAL
Qty: 30 TABLET | Refills: 0 | Status: SHIPPED | OUTPATIENT
Start: 2024-07-12

## 2024-07-12 RX ORDER — HYDROXYZINE HYDROCHLORIDE 50 MG/1
50 TABLET, FILM COATED ORAL 3 TIMES DAILY PRN
Qty: 20 TABLET | Refills: 0 | Status: SHIPPED | OUTPATIENT
Start: 2024-07-12

## 2024-07-12 RX ORDER — ESCITALOPRAM OXALATE 20 MG/1
20 TABLET ORAL DAILY
Qty: 30 TABLET | Refills: 0 | Status: SHIPPED | OUTPATIENT
Start: 2024-07-12

## 2024-07-12 RX ADMIN — ESCITALOPRAM OXALATE 20 MG: 20 TABLET, FILM COATED ORAL at 08:11

## 2024-07-12 ASSESSMENT — ACTIVITIES OF DAILY LIVING (ADL)
ADLS_ACUITY_SCORE: 35

## 2024-07-12 ASSESSMENT — COLUMBIA-SUICIDE SEVERITY RATING SCALE - C-SSRS
TOTAL  NUMBER OF INTERRUPTED ATTEMPTS SINCE LAST CONTACT: NO
TOTAL  NUMBER OF ABORTED OR SELF INTERRUPTED ATTEMPTS SINCE LAST CONTACT: NO
SUICIDE, SINCE LAST CONTACT: NO
1. SINCE LAST CONTACT, HAVE YOU WISHED YOU WERE DEAD OR WISHED YOU COULD GO TO SLEEP AND NOT WAKE UP?: NO
6. HAVE YOU EVER DONE ANYTHING, STARTED TO DO ANYTHING, OR PREPARED TO DO ANYTHING TO END YOUR LIFE?: NO
2. HAVE YOU ACTUALLY HAD ANY THOUGHTS OF KILLING YOURSELF?: NO
ATTEMPT SINCE LAST CONTACT: NO

## 2024-07-12 NOTE — PROGRESS NOTES
Patient has been resting in the recliner watching TV. Calm and cooperative this evening. She reports she didn't slept well last night, but her mood has improved since she come to [facility]. She dinies any SI/HI now. She remaining on observation tonight.
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
## 2024-07-12 NOTE — PROGRESS NOTES
Discharge instructions, medications, and follow up apt reviewed wit pt, copy of AVS provided, she verbalized understanding. Clean dressings applied to lesions on LFA and wrist, no s/s infection.

## 2024-07-12 NOTE — ED PROVIDER NOTES
[facility] Unit - Psychiatric Observation Discharge Summary  [facility] Emergency Department  Discharge Date: 7/12/2024    Isa Day MRN: [id number]   Age: 34 year old YOB: 1990     Brief HPI & Initial ED Course     Chief Complaint   Patient presents with    Suicidal    Depression     HPI  Isa Day is a 34 year old female with history notable for depression who presented to the emergency department with worsening depressive symptoms, suicidal ideation, and recent self-harm. In the emergency department, this patient was determined to be medically stable and transferred to the [facility] unit for psychiatric assessment.  Following initial assessment at [facility], lexapro was further optimized to target depressive symptoms and anxiety. They are now nearing 47 hours in the emergency department. Overnight there were no acute issues.  Isa was agreeable to meet with me and accompany me to a consult room.  Today, she feels as though her emotions are not as heightened and she tells me she has not been tearful.  She feels as though the intensity of her depressive symptoms has started to decrease.  At times she continues to feel anxious however has found as needed hydroxyzine to be helpful.  She recalls ongoing stressors due to her recent workplace injury that left her with a compression fracture in her back.  She has had difficulty navigating Workmen's Comp. and obtaining an MRI to further direct treatment.  She feels as though this is been a significant area of stress leading to heightened emotions.  At times, her back pain has also made sleeping quite challenging.  She has found trazodone at times to be helpful for sleep.  She denies SI/HI and there is no evidence of psychosis. She denies urges to self-harm. She would like to continue to follow with her PCP for medication management noting that she has a good working relationship with this provider and has difficulty opening up to new providers.  She is  "agreeable to a referral for therapy.  She perceives readiness to discharge following the conclusion of our meeting.  She identifies her boyfriend and coworkers as primary supports.         Physical Examination   BP: 124/85  Pulse: 78  Temp: 98.3  F (36.8  C)  Resp: 20  Weight: 83 kg (183 lb)  SpO2: 99 %    Physical Exam  General: Appears stated age.   Neuro: Alert and fully oriented. Extremities appear to demonstrate normal strength on visual inspection.   Integumentary/Skin: no rash visualized, normal color    Psychiatric Examination   Appearance: awake, alert  Attitude:  cooperative  Eye Contact:  fair  Mood:  depressed, patient reports overall \"better\"   Affect:  mood congruent  Speech:  clear, coherent  Psychomotor Behavior:  no evidence of tardive dyskinesia, dystonia, or tics  Thought Process:  linear  Associations:  no loose associations  Thought Content:  no evidence of suicidal ideation or homicidal ideation and no evidence of psychotic thought  Insight:  fair  Judgement:  fair  Oriented to:  time, person, and place  Attention Span and Concentration: intact   Recent and Remote Memory:  intact   Language: able to name/identify objects without impairment  Fund of Knowledge: intact with awareness of current and past events    Results     ED Course as of 07/12/24 1343   Wed Jul 10, 2024   1417 I obtained history and examined the patient as noted above         Labs Ordered and Resulted from Time of ED Arrival to Time of ED Departure - No data to display    Observation Course   The patient was found to have a psychiatric condition that would benefit from an observation stay in the emergency department for further psychiatric stabilization and/or coordination of a safe disposition. The plan upon observation admission included serial assessments of psychiatric condition, potential administration of medications if indicated, further disposition pending the patient's psychiatric course during the monitoring period. "     Serial assessments of the patient's psychiatric condition were performed. Nursing notes were reviewed. During the observation period, the patient did not require medications for agitation, and did not require restraints/seclusion for patient and/or provider safety.     After a period of working with the treatment team on the [facility] unit, the patient's mental state improved to allow a safe transition to outpatient care. After counseling on the diagnosis, work-up, and treatment plan, the patient was discharged. Close follow-up with a psychiatrist and/or therapist was recommended and community psychiatric resources were provided. Patient is to return to the ED if any urgent or potentially life-threatening concerns.     Discharge Diagnoses:   Final diagnoses:   Deliberate self-cutting   Major depressive disorder, recurrent episode, moderate (H)   Insomnia due to other mental disorder   Suicidal ideation       Treatment Plan:  -Continue lexapro 20mg daily further targeting anxiety and depression  -Continue Trazodone 50mg at bedtime for insomnia  -Continue Hydroxyzine 50mg three times daily as needed for anxiety  -Medication education provided this visit including but not limited to: Rationale for medication, importance of medication adherence, medication interactions, common medication side effects, benefits of medications.  -Patient would benefit from referral for individual psychotherapy   -Patient declines referral for psych med management and would like to continue working with her PCP for ongoing medication management   -Problem focused supportive therapy and education provided today related to patient's current and acute stressors, symptoms, and diagnoses.  -Discharge from [facility] with OP and crisis supports     At the time of discharge, the patient's acute suicide risk was determined to be low due to the following factors: Reduction in the intensity of mood/anxiety symptoms that preceded the admission, denial  of suicidal thoughts, denies feeling helpless or helpless, not currently under the influence of alcohol or illicit substances, denies experiencing command hallucinations, no immediate access to firearms. The patient's acute risk could be higher if noncompliant with their treatment plan, medications, follow-up appointments or using illicit substances or alcohol. Protective factors include: social supports, stable housing, employment, help seeking     I spent more than 31 minutes on discharge day activities.    --  JEREMIE Olae CNP  [facility] EMERGENCY DEPT  [facility] Unit       Traci Diaz APRN CNP  07/12/24 [id number]

## 2024-07-12 NOTE — PROGRESS NOTES
Pt sitting calm, watching TV and coloring with no complaints. She reports sleeping well last night and feels ready to return home today. She denies SI or SIB.

## 2024-07-12 NOTE — PROGRESS NOTES
Pt currently taking a shower, she has met with the Provider and [facility], plan is to discharge to home today. Bozenae will be here @ 4 pm.

## 2024-07-13 ENCOUNTER — TELEPHONE (OUTPATIENT)
Dept: BEHAVIORAL HEALTH | Facility: CLINIC | Age: 34
End: 2024-07-13
Payer: COMMERCIAL

## 2024-07-13 NOTE — TELEPHONE ENCOUNTER
Phone number verified in [facility]. [facility] for patient regarding follow up, left [facility] number if they would like additional assistance.  No further follow-up is needed.

## 2025-03-01 ENCOUNTER — HEALTH MAINTENANCE LETTER (OUTPATIENT)
Age: 35
End: 2025-03-01